# Patient Record
Sex: MALE | Race: OTHER | Employment: FULL TIME | URBAN - METROPOLITAN AREA
[De-identification: names, ages, dates, MRNs, and addresses within clinical notes are randomized per-mention and may not be internally consistent; named-entity substitution may affect disease eponyms.]

---

## 2024-03-11 ENCOUNTER — OFFICE VISIT (OUTPATIENT)
Age: 49
End: 2024-03-11
Payer: COMMERCIAL

## 2024-03-11 VITALS
WEIGHT: 229 LBS | SYSTOLIC BLOOD PRESSURE: 121 MMHG | HEART RATE: 82 BPM | DIASTOLIC BLOOD PRESSURE: 77 MMHG | RESPIRATION RATE: 16 BRPM | OXYGEN SATURATION: 100 % | BODY MASS INDEX: 30.35 KG/M2 | HEIGHT: 73 IN | TEMPERATURE: 97.7 F

## 2024-03-11 DIAGNOSIS — E78.00 HYPERCHOLESTEREMIA: ICD-10-CM

## 2024-03-11 DIAGNOSIS — I51.89 DIASTOLIC DYSFUNCTION: ICD-10-CM

## 2024-03-11 DIAGNOSIS — R01.1 SYSTOLIC MURMUR: ICD-10-CM

## 2024-03-11 DIAGNOSIS — R94.31 ABNORMAL ECG: Primary | ICD-10-CM

## 2024-03-11 DIAGNOSIS — I10 PRIMARY HYPERTENSION: ICD-10-CM

## 2024-03-11 DIAGNOSIS — N18.31 STAGE 3A CHRONIC KIDNEY DISEASE (HCC): ICD-10-CM

## 2024-03-11 PROBLEM — N20.0 KIDNEY STONE: Status: ACTIVE | Noted: 2020-05-21

## 2024-03-11 PROBLEM — E78.5 HYPERLIPIDEMIA: Status: ACTIVE | Noted: 2019-06-11

## 2024-03-11 PROBLEM — K60.3 ANAL FISTULA: Status: ACTIVE | Noted: 2020-01-14

## 2024-03-11 PROBLEM — K60.30 ANAL FISTULA: Status: ACTIVE | Noted: 2020-01-14

## 2024-03-11 PROBLEM — K21.9 GASTROESOPHAGEAL REFLUX DISEASE WITHOUT ESOPHAGITIS: Status: ACTIVE | Noted: 2024-01-09

## 2024-03-11 PROBLEM — Z91.09 ENVIRONMENTAL ALLERGIES: Status: ACTIVE | Noted: 2019-06-11

## 2024-03-11 PROCEDURE — 3074F SYST BP LT 130 MM HG: CPT | Performed by: INTERNAL MEDICINE

## 2024-03-11 PROCEDURE — 99205 OFFICE O/P NEW HI 60 MIN: CPT | Performed by: INTERNAL MEDICINE

## 2024-03-11 PROCEDURE — 3078F DIAST BP <80 MM HG: CPT | Performed by: INTERNAL MEDICINE

## 2024-03-11 PROCEDURE — 93000 ELECTROCARDIOGRAM COMPLETE: CPT | Performed by: INTERNAL MEDICINE

## 2024-03-11 RX ORDER — SILDENAFIL 100 MG/1
100 TABLET, FILM COATED ORAL PRN
COMMUNITY
Start: 2024-01-09

## 2024-03-11 RX ORDER — FLUTICASONE PROPIONATE 93 UG/1
SPRAY, METERED NASAL
COMMUNITY
Start: 2020-03-25 | End: 2024-03-11 | Stop reason: ALTCHOICE

## 2024-03-11 RX ORDER — POLYETHYLENE GLYCOL-3350 AND ELECTROLYTES 236; 6.74; 5.86; 2.97; 22.74 G/274.31G; G/274.31G; G/274.31G; G/274.31G; G/274.31G
POWDER, FOR SOLUTION ORAL ONCE
COMMUNITY
Start: 2024-01-16 | End: 2024-03-11 | Stop reason: ALTCHOICE

## 2024-03-11 RX ORDER — ERGOCALCIFEROL 1.25 MG/1
50000 CAPSULE ORAL WEEKLY
COMMUNITY
Start: 2024-01-09

## 2024-03-11 RX ORDER — TAMSULOSIN HYDROCHLORIDE 0.4 MG/1
1 CAPSULE ORAL DAILY
COMMUNITY
Start: 2024-01-09 | End: 2024-03-11 | Stop reason: ALTCHOICE

## 2024-03-11 RX ORDER — FLUTICASONE PROPIONATE 50 MCG
1 SPRAY, SUSPENSION (ML) NASAL DAILY
COMMUNITY
Start: 2022-09-15 | End: 2024-03-11 | Stop reason: ALTCHOICE

## 2024-03-11 RX ORDER — VARDENAFIL HYDROCHLORIDE 20 MG/1
20 TABLET ORAL PRN
COMMUNITY
End: 2024-03-11 | Stop reason: ALTCHOICE

## 2024-03-11 ASSESSMENT — ENCOUNTER SYMPTOMS
GASTROINTESTINAL NEGATIVE: 1
SHORTNESS OF BREATH: 0
ALLERGIC/IMMUNOLOGIC NEGATIVE: 1
EYES NEGATIVE: 1

## 2024-03-11 NOTE — PROGRESS NOTES
NANCY-jorgito.    Carlos Luu M.D.        Return in about 7 months (around 10/11/2024) for follow up.      On this date 3/11/2024 I have spent 41 minutes reviewing previous notes, test results and face to face with the patient discussing the diagnosis and importance of compliance with the treatment plan as well as documenting on the day of the visit.      An electronic signature was used to authenticate this note.    --Carlos Luu MD

## 2024-10-11 ENCOUNTER — OFFICE VISIT (OUTPATIENT)
Age: 49
End: 2024-10-11
Payer: COMMERCIAL

## 2024-10-11 VITALS
HEART RATE: 80 BPM | OXYGEN SATURATION: 100 % | TEMPERATURE: 97.1 F | WEIGHT: 227 LBS | BODY MASS INDEX: 30.75 KG/M2 | HEIGHT: 72 IN | DIASTOLIC BLOOD PRESSURE: 86 MMHG | SYSTOLIC BLOOD PRESSURE: 126 MMHG

## 2024-10-11 DIAGNOSIS — E78.00 HYPERCHOLESTEREMIA: ICD-10-CM

## 2024-10-11 DIAGNOSIS — I51.89 DIASTOLIC DYSFUNCTION: Primary | ICD-10-CM

## 2024-10-11 DIAGNOSIS — R01.1 SYSTOLIC MURMUR: ICD-10-CM

## 2024-10-11 DIAGNOSIS — R94.31 ABNORMAL ECG: ICD-10-CM

## 2024-10-11 DIAGNOSIS — N18.31 STAGE 3A CHRONIC KIDNEY DISEASE (HCC): ICD-10-CM

## 2024-10-11 DIAGNOSIS — I10 PRIMARY HYPERTENSION: ICD-10-CM

## 2024-10-11 PROCEDURE — 3074F SYST BP LT 130 MM HG: CPT | Performed by: INTERNAL MEDICINE

## 2024-10-11 PROCEDURE — 3079F DIAST BP 80-89 MM HG: CPT | Performed by: INTERNAL MEDICINE

## 2024-10-11 PROCEDURE — 99214 OFFICE O/P EST MOD 30 MIN: CPT | Performed by: INTERNAL MEDICINE

## 2024-10-11 RX ORDER — TOPIRAMATE 25 MG/1
TABLET, FILM COATED ORAL
COMMUNITY

## 2024-10-11 RX ORDER — PHENTERMINE HYDROCHLORIDE 37.5 MG/1
TABLET ORAL
COMMUNITY

## 2024-10-11 ASSESSMENT — ENCOUNTER SYMPTOMS
SHORTNESS OF BREATH: 0
EYES NEGATIVE: 1
GASTROINTESTINAL NEGATIVE: 1
ALLERGIC/IMMUNOLOGIC NEGATIVE: 1

## 2024-10-11 ASSESSMENT — PATIENT HEALTH QUESTIONNAIRE - PHQ9
1. LITTLE INTEREST OR PLEASURE IN DOING THINGS: NOT AT ALL
SUM OF ALL RESPONSES TO PHQ QUESTIONS 1-9: 0
2. FEELING DOWN, DEPRESSED OR HOPELESS: NOT AT ALL
SUM OF ALL RESPONSES TO PHQ9 QUESTIONS 1 & 2: 0

## 2024-10-11 NOTE — PROGRESS NOTES
Ajit Paul (:  1975) is a 49 y.o. male,New patient, here for evaluation of the following chief complaint(s):  Follow-up (7month)    Subjective   SUBJECTIVE/OBJECTIVE:  HPI  Patient presents for evaluation.  He has a history of hypertension and hyperlipidemia.  Patient is currently on amlodipine and atorvastatin.  It is of note that he also has evidence of chronic kidney disease.  Patient has had limited symptoms but because he had an EKG performed that was abnormal, he came here for further evaluation.  Patient generally does well and is able to exercise and carry on daily activities without limitation.  No chest discomfort.  No shortness of breath.  No palpitations.  No orthopnea.  No history of known coronary disease or systolic heart failure.  Echo shows normal heart function.  Stress test was negative for active ischemia.    I have carefully reviewed all available medical records, previous office notes, lab, x-ray and procedure reports    Past Medical History:   Diagnosis Date    Acid reflux     Hypertension     Sinus trouble         Past Surgical History:   Procedure Laterality Date    COLONOSCOPY      SEPTOPLASTY N/A 2024    SEPTOPLASTY AND SUBMUCOSAL RESECTION OF INFERIOR TURBINATES        Allergies   Allergen Reactions    Seasonal      Other Reaction(s): unknown        Current Outpatient Medications   Medication Sig Dispense Refill    Nutritional Supplements (BOOST HIGH PROTEIN PO) Take by mouth daily      NONFORMULARY Take by mouth daily Test Boost X      NONFORMULARY Take by mouth daily Alpha Brain      NONFORMULARY Take by mouth daily Burn Evolve      omeprazole (PRILOSEC) 40 MG delayed release capsule Take 1 capsule by mouth daily      atorvastatin (LIPITOR) 40 MG tablet Take 1 tablet by mouth daily      amLODIPine (NORVASC) 10 MG tablet Take 1 tablet by mouth daily      phentermine (ADIPEX-P) 37.5 MG tablet  (Patient not taking: Reported on 10/11/2024)      topiramate (TOPAMAX) 25 MG

## 2024-10-11 NOTE — PROGRESS NOTES
1. \"Have you been to the ER, urgent care clinic since your last visit?  Hospitalized since your last visit?\" Reviewed by Dr. Carlos Luu    2. \"Have you seen or consulted any other health care providers outside of the [facility] since your last visit?\" Reviewed by Dr. Carlos Luu

## 2025-05-15 ENCOUNTER — OFFICE VISIT (OUTPATIENT)
Age: 50
End: 2025-05-15
Payer: COMMERCIAL

## 2025-05-15 VITALS
WEIGHT: 232 LBS | OXYGEN SATURATION: 99 % | SYSTOLIC BLOOD PRESSURE: 139 MMHG | HEIGHT: 72 IN | BODY MASS INDEX: 31.42 KG/M2 | DIASTOLIC BLOOD PRESSURE: 88 MMHG | HEART RATE: 65 BPM | TEMPERATURE: 97 F

## 2025-05-15 DIAGNOSIS — R94.31 ABNORMAL ECG: ICD-10-CM

## 2025-05-15 DIAGNOSIS — N18.31 STAGE 3A CHRONIC KIDNEY DISEASE (HCC): ICD-10-CM

## 2025-05-15 DIAGNOSIS — I10 PRIMARY HYPERTENSION: Primary | ICD-10-CM

## 2025-05-15 DIAGNOSIS — R01.1 SYSTOLIC MURMUR: ICD-10-CM

## 2025-05-15 DIAGNOSIS — E78.00 HYPERCHOLESTEREMIA: ICD-10-CM

## 2025-05-15 DIAGNOSIS — I51.89 DIASTOLIC DYSFUNCTION: ICD-10-CM

## 2025-05-15 PROCEDURE — 99215 OFFICE O/P EST HI 40 MIN: CPT | Performed by: INTERNAL MEDICINE

## 2025-05-15 PROCEDURE — 3075F SYST BP GE 130 - 139MM HG: CPT | Performed by: INTERNAL MEDICINE

## 2025-05-15 PROCEDURE — 3079F DIAST BP 80-89 MM HG: CPT | Performed by: INTERNAL MEDICINE

## 2025-05-15 RX ORDER — LOSARTAN POTASSIUM 50 MG/1
50 TABLET ORAL DAILY
COMMUNITY
Start: 2025-04-11

## 2025-05-15 RX ORDER — FEXOFENADINE HCL 180 MG/1
180 TABLET ORAL DAILY
COMMUNITY
Start: 2025-02-21

## 2025-05-15 ASSESSMENT — PATIENT HEALTH QUESTIONNAIRE - PHQ9
SUM OF ALL RESPONSES TO PHQ QUESTIONS 1-9: 0
1. LITTLE INTEREST OR PLEASURE IN DOING THINGS: NOT AT ALL
SUM OF ALL RESPONSES TO PHQ QUESTIONS 1-9: 0
2. FEELING DOWN, DEPRESSED OR HOPELESS: NOT AT ALL

## 2025-05-15 ASSESSMENT — ENCOUNTER SYMPTOMS
ALLERGIC/IMMUNOLOGIC NEGATIVE: 1
GASTROINTESTINAL NEGATIVE: 1
EYES NEGATIVE: 1
SHORTNESS OF BREATH: 0

## 2025-05-15 NOTE — PROGRESS NOTES
Ajit Paul (:  1975) is a 50 y.o. male,Established patient, here for evaluation of the following chief complaint(s):  7 Month Follow Up    Subjective   SUBJECTIVE/OBJECTIVE:  History of Present Illness  Patient presents for evaluation.  He has a history of hypertension and hyperlipidemia.  Patient is currently on amlodipine and atorvastatin.  It is of note that he also has evidence of chronic kidney disease.     He has been actively engaging in physical activities, including gym workouts and tennis, as per the advice given. He is currently on losartan, which he did not take today to allow for an accurate assessment of his blood pressure. His home readings typically range from 120 to 125, with occasional spikes to 140 during periods of high stress or intense exercise. He is no longer taking triamterene-hydrochlorothiazide. He reports experiencing pruritus and sleep disturbances while on amlodipine, leading to its discontinuation.    He had some kidney problems. He underwent a blood test, which showed normal results.    His current medication regimen includes atorvastatin and omeprazole for gastrointestinal issues.    SOCIAL HISTORY  Marital Status:   Exercise: Goes to the gym and plays tennis    I have carefully reviewed all available medical records, previous office notes, lab, x-ray and procedure reports    Past Medical History:   Diagnosis Date    Acid reflux     Hypertension     Sinus trouble         Past Surgical History:   Procedure Laterality Date    COLONOSCOPY      SEPTOPLASTY N/A 2024    SEPTOPLASTY AND SUBMUCOSAL RESECTION OF INFERIOR TURBINATES        Allergies   Allergen Reactions    Environmental/Seasonal      Other Reaction(s): unknown        Current Outpatient Medications   Medication Sig Dispense Refill    losartan (COZAAR) 50 MG tablet Take 1 tablet by mouth daily      fexofenadine (ALLEGRA) 180 MG tablet Take 1 tablet by mouth daily      Nutritional Supplements (BOOST

## 2025-05-15 NOTE — PROGRESS NOTES
1. \"Have you been to the ER, urgent care clinic since your last visit?  Hospitalized since your last visit?\" Reviewed by Dr. Carlos Luu     2. \"Have you seen or consulted any other health care providers outside of the [facility] since your last visit?\" Reviewed by Dr. Carlos Luu